# Patient Record
Sex: MALE | Race: WHITE
[De-identification: names, ages, dates, MRNs, and addresses within clinical notes are randomized per-mention and may not be internally consistent; named-entity substitution may affect disease eponyms.]

---

## 2018-02-16 ENCOUNTER — HOSPITAL ENCOUNTER (OUTPATIENT)
Dept: HOSPITAL 75 - SDC | Age: 78
Discharge: HOME | End: 2018-02-16
Attending: SPECIALIST
Payer: COMMERCIAL

## 2018-02-16 VITALS — DIASTOLIC BLOOD PRESSURE: 76 MMHG | SYSTOLIC BLOOD PRESSURE: 151 MMHG

## 2018-02-16 VITALS — DIASTOLIC BLOOD PRESSURE: 72 MMHG | SYSTOLIC BLOOD PRESSURE: 148 MMHG

## 2018-02-16 VITALS — HEIGHT: 69 IN | WEIGHT: 178 LBS | BODY MASS INDEX: 26.36 KG/M2

## 2018-02-16 DIAGNOSIS — Z95.1: ICD-10-CM

## 2018-02-16 DIAGNOSIS — H26.40: Primary | ICD-10-CM

## 2018-02-16 DIAGNOSIS — Z96.651: ICD-10-CM

## 2018-02-16 RX ADMIN — TETRACAINE HYDROCHLORIDE PRN ML: 5 SOLUTION OPHTHALMIC at 10:08

## 2018-02-16 RX ADMIN — PHENYLEPHRINE HYDROCHLORIDE PRN ML: 100 SOLUTION/ DROPS OPHTHALMIC at 10:17

## 2018-02-16 RX ADMIN — TETRACAINE HYDROCHLORIDE PRN ML: 5 SOLUTION OPHTHALMIC at 10:04

## 2018-02-16 RX ADMIN — PHENYLEPHRINE HYDROCHLORIDE PRN ML: 100 SOLUTION/ DROPS OPHTHALMIC at 10:04

## 2018-02-16 RX ADMIN — TETRACAINE HYDROCHLORIDE PRN ML: 5 SOLUTION OPHTHALMIC at 10:17

## 2018-02-16 RX ADMIN — PHENYLEPHRINE HYDROCHLORIDE PRN ML: 100 SOLUTION/ DROPS OPHTHALMIC at 10:23

## 2018-02-16 RX ADMIN — TETRACAINE HYDROCHLORIDE PRN ML: 5 SOLUTION OPHTHALMIC at 10:23

## 2018-02-16 NOTE — PROGRESS NOTE-PRE OPERATIVE
Pre-Operative Progress Note


H&P Reviewed


The H&P was reviewed, patient examined and no changes noted.


Date Seen by Provider:  Feb 16, 2018


Time Seen by Provider:  10:17


Date H&P Reviewed:  Feb 16, 2018


Time H&P Reviewed:  10:17


Pre-Operative Diagnosis:  pco r eye











ALEXIS SANDERS MD Feb 16, 2018 10:17

## 2018-02-16 NOTE — OPHTHALMOLOGY OPERATIVE REPORT
YAG Capsulotomy


PREOPERATIVE DIAGNOSIS:    Secondary Cataract Right Eye


POSTOPERATIVE DIAGNOSIS: Secondary Cataract Right Eye





PROCEDURE: YAG Capsulotomy, right eye





SURGEON: Hair Sanders 





ANESTHESIA: Topical with sedation





COMPLICATIONS: None





ESTIMATED BLOOD LOSS: Minimal 





DESCRIPTION OF PROCEDURE:


After proper informed consent was obtained, the patient's, a 77 male, right eye 

received one drop of Tropicamide and one drop of Tetracaine. The patient was 

then placed at the YAG laser and using a power of [ 3.5] millijoules and [ 21] 

bursts were used to fashion a central capsulotomy. The patient tolerated the 

procedure well without complications and the patient's pressure was [ 12] 

shortly after the laser.











HAIR SANDERS MD Feb 16, 2018 11:09